# Patient Record
(demographics unavailable — no encounter records)

---

## 2025-02-13 NOTE — PHYSICAL EXAM
[Midline] : trachea located in midline position [Laryngoscopy Performed] : laryngoscopy was performed, see procedure section for findings [Normal] : no rashes [de-identified] : Incision well healed. Ivis tube in place. Fistula prosthesis in place with no signs of drainage [de-identified] : Extensive periodontal disease.

## 2025-02-13 NOTE — HISTORY OF PRESENT ILLNESS
[de-identified] : 75 year old male presents for a follow up for larynx cancer, [id number], Stage Donna. S/P TL, bilateral neck dissection and speech prosthesis placement on 2/23/18. s/p 4/3/21 esophagoscopy and biopsy showed benign results. Pt dilated on 4/3/21 as well.  States not doing speech therapy in nursing home anymore. PT staying at [facility]. TEP and trach was changed with Dr. Lozano 09/04/24. No known bleeding or aspiration reported by accompanying staff member. Patient denies drainage from site. Currently has PEG. Unable to speak at this time.  s/p Flexible esophagoscopy and placement of tracheoesophageal prosthesis on 12/27/2024.   Last CT neck 4/5/24:  IMPRESSION: Stable interval follow-up CT examination without evidence of neoplastic disease progression nor recurrence. No new or enlarging cervical lymph nodes. Unchanged lucent lesion within the left anterior maxilla. Unchanged appearing bilateral periparotid nodules.  CT chest 4/5/24: IMPRESSION: No discrete pulmonary nodules or consolidations

## 2025-02-13 NOTE — REASON FOR VISIT
[Subsequent Evaluation] : a subsequent evaluation for [Other: _____] : [unfilled] [FreeTextEntry2] :  larynx cancer.

## 2025-04-11 NOTE — PHYSICAL EXAM
[Midline] : trachea located in midline position [Laryngoscopy Performed] : laryngoscopy was performed, see procedure section for findings [Normal] : no rashes [de-identified] : Incision well healed. Ivis tube in place. Fistula prosthesis in place with no signs of drainage [de-identified] : Extensive periodontal disease.

## 2025-04-11 NOTE — PHYSICAL EXAM
[Midline] : trachea located in midline position [Laryngoscopy Performed] : laryngoscopy was performed, see procedure section for findings [Normal] : no rashes [de-identified] : Incision well healed. Ivis tube in place. Fistula prosthesis in place with no signs of drainage [de-identified] : Extensive periodontal disease.

## 2025-04-11 NOTE — HISTORY OF PRESENT ILLNESS
[de-identified] : 75 year old male presents for a follow up for larynx cancer, [id number], Stage Donna. S/P TL, bilateral neck dissection and speech prosthesis placement on 2/23/18. s/p 4/3/21 esophagoscopy and biopsy showed benign results. Pt dilated on 4/3/21 as well.  States not doing speech therapy in nursing home anymore. PT staying at [facility]. Currently has PEG. Unable to speak at this time. NPO.  s/p Flexible esophagoscopy and placement of tracheoesophageal prosthesis on 12/27/2024.    Last CT neck 4/5/24:  IMPRESSION: Stable interval follow-up CT examination without evidence of neoplastic disease progression nor recurrence. No new or enlarging cervical lymph nodes. Unchanged lucent lesion within the left anterior maxilla. Unchanged appearing bilateral periparotid nodules.  CT chest 4/5/24: IMPRESSION: No discrete pulmonary nodules or consolidations

## 2025-04-11 NOTE — HISTORY OF PRESENT ILLNESS
[de-identified] : 75 year old male presents for a follow up for larynx cancer, [id number], Stage Donna. S/P TL, bilateral neck dissection and speech prosthesis placement on 2/23/18. s/p 4/3/21 esophagoscopy and biopsy showed benign results. Pt dilated on 4/3/21 as well.  States not doing speech therapy in nursing home anymore. PT staying at [facility]. Currently has PEG. Unable to speak at this time. NPO.  s/p Flexible esophagoscopy and placement of tracheoesophageal prosthesis on 12/27/2024.    Last CT neck 4/5/24:  IMPRESSION: Stable interval follow-up CT examination without evidence of neoplastic disease progression nor recurrence. No new or enlarging cervical lymph nodes. Unchanged lucent lesion within the left anterior maxilla. Unchanged appearing bilateral periparotid nodules.  CT chest 4/5/24: IMPRESSION: No discrete pulmonary nodules or consolidations

## 2025-05-23 NOTE — PHYSICAL EXAM
[Midline] : trachea located in midline position [Laryngoscopy Performed] : laryngoscopy was performed, see procedure section for findings [Normal] : no rashes [de-identified] : Incision well healed. Ivis tube in place. Fistula prosthesis in place with no signs of drainage [de-identified] : Extensive periodontal disease.

## 2025-05-23 NOTE — HISTORY OF PRESENT ILLNESS
[de-identified] : 75 year old male presents for a follow up for larynx cancer, [id number], Stage Donna. S/P TL, bilateral neck dissection and speech prosthesis placement on 2/23/18. s/p 4/3/21 esophagoscopy and biopsy showed benign results. Pt dilated on 4/3/21 as well.  States not doing speech therapy in nursing home anymore. PT staying at [facility]. Currently has PEG. Unable to speak at this time. NPO.  s/p Flexible esophagoscopy and placement of TEP fistula closure device on12/27/2024.   Last CT neck 4/5/24:  IMPRESSION: Stable interval follow-up CT examination without evidence of neoplastic disease progression nor recurrence. No new or enlarging cervical lymph nodes. Unchanged lucent lesion within the left anterior maxilla. Unchanged appearing bilateral periparotid nodules.  CT chest 4/5/24: IMPRESSION: No discrete pulmonary nodules or consolidations  PT had high BP today in clinic, asymptomatic.   PT will f/up with nursing home MD regarding this.

## 2025-06-27 NOTE — PHYSICAL EXAM
[Midline] : trachea located in midline position [Laryngoscopy Performed] : laryngoscopy was performed, see procedure section for findings [Normal] : no rashes [de-identified] : Incision well healed. Ivis tube in place. Fistula prosthesis in place with no signs of drainage [de-identified] : Extensive periodontal disease.

## 2025-06-27 NOTE — HISTORY OF PRESENT ILLNESS
[de-identified] : 76 year old male presents for a follow up for larynx cancer, [id number], Stage Donna. S/P TL, bilateral neck dissection and speech prosthesis placement on 2/23/18. s/p 4/3/21 esophagoscopy and biopsy showed benign results. Pt dilated on 4/3/21 as well.  Does not get speech therapy in nursing home anymore. PT staying at [facility]. Currently has PEG. Unable to speak at this time. NPO.  s/p Flexible esophagoscopy and placement of TEP fistula closure device on12/27/2024.   Last CT neck 4/5/24:  IMPRESSION: Stable interval follow-up CT examination without evidence of neoplastic disease progression nor recurrence. No new or enlarging cervical lymph nodes. Unchanged lucent lesion within the left anterior maxilla. Unchanged appearing bilateral periparotid nodules.  CT chest 4/5/24: IMPRESSION: No discrete pulmonary nodules or consolidations  PT went to the OR on 6/2/25 and had removal the fistula prosthesis.  Pt has a 6 CN trach in place.  PT also had new PEG placed while in the hospital.

## 2025-06-27 NOTE — HISTORY OF PRESENT ILLNESS
[de-identified] : 76 year old male presents for a follow up for larynx cancer, [id number], Stage Donna. S/P TL, bilateral neck dissection and speech prosthesis placement on 2/23/18. s/p 4/3/21 esophagoscopy and biopsy showed benign results. Pt dilated on 4/3/21 as well.  Does not get speech therapy in nursing home anymore. PT staying at [facility]. Currently has PEG. Unable to speak at this time. NPO.  s/p Flexible esophagoscopy and placement of TEP fistula closure device on12/27/2024.   Last CT neck 4/5/24:  IMPRESSION: Stable interval follow-up CT examination without evidence of neoplastic disease progression nor recurrence. No new or enlarging cervical lymph nodes. Unchanged lucent lesion within the left anterior maxilla. Unchanged appearing bilateral periparotid nodules.  CT chest 4/5/24: IMPRESSION: No discrete pulmonary nodules or consolidations  PT went to the OR on 6/2/25 and had removal the fistula prosthesis.  Pt has a 6 CN trach in place.  PT also had new PEG placed while in the hospital.

## 2025-06-27 NOTE — PHYSICAL EXAM
[Midline] : trachea located in midline position [Laryngoscopy Performed] : laryngoscopy was performed, see procedure section for findings [Normal] : no rashes [de-identified] : Incision well healed. Ivis tube in place. Fistula prosthesis in place with no signs of drainage [de-identified] : Extensive periodontal disease.